# Patient Record
Sex: FEMALE
[De-identification: names, ages, dates, MRNs, and addresses within clinical notes are randomized per-mention and may not be internally consistent; named-entity substitution may affect disease eponyms.]

---

## 2020-01-01 ENCOUNTER — RX ONLY (OUTPATIENT)
Age: 40
Setting detail: RX ONLY
End: 2020-01-01

## 2021-12-01 ENCOUNTER — OFFICE VISIT (OUTPATIENT)
Age: 41
End: 2021-12-01

## 2022-03-30 ENCOUNTER — TELEPHONE ENCOUNTER (OUTPATIENT)
Dept: URBAN - METROPOLITAN AREA CLINIC 9 | Facility: CLINIC | Age: 42
End: 2022-03-30

## 2022-03-30 ENCOUNTER — OFFICE VISIT (OUTPATIENT)
Dept: URBAN - METROPOLITAN AREA CLINIC 9 | Facility: CLINIC | Age: 42
End: 2022-03-30

## 2022-04-05 ENCOUNTER — OFFICE VISIT (OUTPATIENT)
Dept: URBAN - METROPOLITAN AREA CLINIC 9 | Facility: CLINIC | Age: 42
End: 2022-04-05

## 2022-04-07 ENCOUNTER — TELEPHONE ENCOUNTER (OUTPATIENT)
Dept: URBAN - METROPOLITAN AREA CLINIC 9 | Facility: CLINIC | Age: 42
End: 2022-04-07

## 2022-04-13 ENCOUNTER — OFFICE VISIT (OUTPATIENT)
Dept: URBAN - METROPOLITAN AREA CLINIC 63 | Facility: CLINIC | Age: 42
End: 2022-04-13

## 2022-05-02 ENCOUNTER — TELEPHONE ENCOUNTER (OUTPATIENT)
Dept: URBAN - METROPOLITAN AREA CLINIC 9 | Facility: CLINIC | Age: 42
End: 2022-05-02

## 2022-05-04 ENCOUNTER — OFFICE VISIT (OUTPATIENT)
Dept: URBAN - METROPOLITAN AREA SURGERY CENTER 9 | Facility: SURGERY CENTER | Age: 42
End: 2022-05-04

## 2022-05-21 ENCOUNTER — LAB OUTSIDE AN ENCOUNTER (OUTPATIENT)
Age: 42
End: 2022-05-21

## 2022-05-24 LAB
ABSOLUTE BASOPHILS: (no result)
ABSOLUTE EOSINOPHILS: (no result)
ABSOLUTE LYMPHOCYTES: (no result)
ABSOLUTE MONOCYTES: (no result)
ABSOLUTE NEUTROPHILS: (no result)
ALBUMIN/GLOBULIN RATIO: (no result)
ALBUMIN: (no result)
ALKALINE PHOSPHATASE: (no result)
ALT: (no result)
AST: (no result)
BASOPHILS: (no result)
BILIRUBIN, DIRECT: (no result)
BILIRUBIN, INDIRECT: 0.3
BILIRUBIN, TOTAL: (no result)
C-REACTIVE PROTEIN: (no result)
EOSINOPHILS: (no result)
GLOBULIN: 2.7
HEMATOCRIT: (no result)
HEMOGLOBIN: (no result)
LIPASE: (no result)
LYMPHOCYTES: (no result)
MCH: (no result)
MCHC: (no result)
MCV: (no result)
MONOCYTES: (no result)
MPV: (no result)
NEUTROPHILS: (no result)
PLATELET COUNT: 355
PROTEIN, TOTAL: (no result)
RDW: (no result)
RED BLOOD CELL COUNT: (no result)
WHITE BLOOD CELL COUNT: 9.4

## 2022-06-06 ENCOUNTER — TELEPHONE ENCOUNTER (OUTPATIENT)
Dept: URBAN - METROPOLITAN AREA CLINIC 9 | Facility: CLINIC | Age: 42
End: 2022-06-06

## 2022-06-22 ENCOUNTER — TELEPHONE ENCOUNTER (OUTPATIENT)
Dept: URBAN - METROPOLITAN AREA CLINIC 9 | Facility: CLINIC | Age: 42
End: 2022-06-22

## 2022-07-01 ENCOUNTER — TELEPHONE ENCOUNTER (OUTPATIENT)
Dept: URBAN - METROPOLITAN AREA CLINIC 9 | Facility: CLINIC | Age: 42
End: 2022-07-01

## 2022-07-08 ENCOUNTER — OFFICE VISIT (OUTPATIENT)
Dept: URBAN - METROPOLITAN AREA CLINIC 9 | Facility: CLINIC | Age: 42
End: 2022-07-08

## 2022-07-09 ENCOUNTER — TELEPHONE ENCOUNTER (OUTPATIENT)
Dept: URBAN - METROPOLITAN AREA CLINIC 121 | Facility: CLINIC | Age: 42
End: 2022-07-09

## 2022-07-10 ENCOUNTER — TELEPHONE ENCOUNTER (OUTPATIENT)
Dept: URBAN - METROPOLITAN AREA CLINIC 121 | Facility: CLINIC | Age: 42
End: 2022-07-10

## 2022-07-11 ENCOUNTER — TELEPHONE ENCOUNTER (OUTPATIENT)
Dept: URBAN - METROPOLITAN AREA CLINIC 9 | Facility: CLINIC | Age: 42
End: 2022-07-11

## 2022-07-13 ENCOUNTER — TELEPHONE ENCOUNTER (OUTPATIENT)
Dept: URBAN - METROPOLITAN AREA CLINIC 9 | Facility: CLINIC | Age: 42
End: 2022-07-13

## 2022-07-18 ENCOUNTER — TELEPHONE ENCOUNTER (OUTPATIENT)
Dept: URBAN - METROPOLITAN AREA CLINIC 9 | Facility: CLINIC | Age: 42
End: 2022-07-18

## 2022-07-25 ENCOUNTER — TELEPHONE ENCOUNTER (OUTPATIENT)
Dept: URBAN - METROPOLITAN AREA CLINIC 9 | Facility: CLINIC | Age: 42
End: 2022-07-25

## 2022-07-30 ENCOUNTER — TELEPHONE ENCOUNTER (OUTPATIENT)
Age: 42
End: 2022-07-30

## 2022-07-30 RX ORDER — SIMETHICONE 125 MG/1
1 (ONE) TABLET, CHEWABLE ORAL
Qty: 0 | Refills: 2 | OUTPATIENT
Start: 2022-04-05 | End: 2022-05-05

## 2022-07-31 ENCOUNTER — TELEPHONE ENCOUNTER (OUTPATIENT)
Age: 42
End: 2022-07-31

## 2022-07-31 RX ORDER — SIMETHICONE 125 MG/1
1 (ONE) TABLET, CHEWABLE ORAL
Qty: 0 | Refills: 2 | Status: ACTIVE | COMMUNITY
Start: 2022-04-05

## 2022-08-12 ENCOUNTER — LAB OUTSIDE AN ENCOUNTER (OUTPATIENT)
Dept: URBAN - METROPOLITAN AREA CLINIC 9 | Facility: CLINIC | Age: 42
End: 2022-08-12

## 2022-08-12 ENCOUNTER — OFFICE VISIT (OUTPATIENT)
Dept: URBAN - METROPOLITAN AREA CLINIC 9 | Facility: CLINIC | Age: 42
End: 2022-08-12
Payer: COMMERCIAL

## 2022-08-12 VITALS
BODY MASS INDEX: 39.51 KG/M2 | SYSTOLIC BLOOD PRESSURE: 114 MMHG | HEIGHT: 63 IN | WEIGHT: 223 LBS | DIASTOLIC BLOOD PRESSURE: 70 MMHG

## 2022-08-12 DIAGNOSIS — E66.9 OBESITY WITH BODY MASS INDEX 30 OR GREATER: ICD-10-CM

## 2022-08-12 DIAGNOSIS — K76.0 FATTY LIVER: ICD-10-CM

## 2022-08-12 DIAGNOSIS — R10.13 ABDOMINAL PAIN, EPIGASTRIC: ICD-10-CM

## 2022-08-12 DIAGNOSIS — R14.0 BLOATING: ICD-10-CM

## 2022-08-12 DIAGNOSIS — K74.02 ADVANCED HEPATIC FIBROSIS: ICD-10-CM

## 2022-08-12 DIAGNOSIS — R79.89 ABNORMAL LFTS: ICD-10-CM

## 2022-08-12 PROCEDURE — 99215 OFFICE O/P EST HI 40 MIN: CPT | Performed by: INTERNAL MEDICINE

## 2022-08-12 PROCEDURE — 76942 ECHO GUIDE FOR BIOPSY: CPT | Performed by: INTERNAL MEDICINE

## 2022-08-12 RX ORDER — SIMETHICONE 125 MG/1
1 (ONE) TABLET, CHEWABLE ORAL
Qty: 0 | Refills: 2 | Status: DISCONTINUED | COMMUNITY
Start: 2022-04-05

## 2022-08-12 RX ORDER — BACITRACIN 500 UNIT/G
AS DIRECTED OINTMENT (GRAM) TOPICAL ONCE DAILY
Qty: 30 | Refills: 0 | OUTPATIENT
Start: 2022-08-12 | End: 2022-09-11

## 2022-08-12 RX ORDER — HYDROCHLOROTHIAZIDE 25 MG/1
TAKE 1 TABLET BY MOUTH EVERY DAY TABLET ORAL
Qty: 30 EACH | Refills: 2 | Status: ACTIVE | COMMUNITY

## 2022-08-12 RX ORDER — LISINOPRIL 10 MG/1
TAKE 1 TABLET BY MOUTH DAILY. STOP COMBO PILL. TAKE DAILY WITH HCTZ BY MOUTH EVERY DAY TABLET ORAL
Qty: 30 EACH | Refills: 2 | Status: ACTIVE | COMMUNITY

## 2022-08-12 NOTE — HPI-TODAY'S VISIT:
42yo F seen for mild elevated transaminases fibroscan showing S3F4. drinks 5 drinks on friday night for the past several years.  She has no known prior hx of liver dz.  She has no fmh of liver dz.  We discussed gettting further testing and doing liver bx to confirm degree of fibrosis given she has no known prior liver dz.  She also has autoimmune thyroid dz. There has been no associated dysphagia, GERD, anorexia, weight loss, anemia, occult blood in stool, rectal bleeding, melena, nausea, vomiting, abdominal pain, postprandial fullness, early satiety, abdominal distention, bloating, change in bowel habits, change in stool caliber, diarrhea, constipation, anticoagulant/antiplatelet or NSAID's use.     No FMH of GI cancers including CRC.

## 2022-08-15 ENCOUNTER — TELEPHONE ENCOUNTER (OUTPATIENT)
Dept: URBAN - METROPOLITAN AREA CLINIC 7 | Facility: CLINIC | Age: 42
End: 2022-08-15

## 2022-08-15 ENCOUNTER — LAB OUTSIDE AN ENCOUNTER (OUTPATIENT)
Dept: URBAN - METROPOLITAN AREA CLINIC 7 | Facility: CLINIC | Age: 42
End: 2022-08-15

## 2022-08-19 ENCOUNTER — LAB OUTSIDE AN ENCOUNTER (OUTPATIENT)
Dept: URBAN - METROPOLITAN AREA CLINIC 9 | Facility: CLINIC | Age: 42
End: 2022-08-19

## 2022-08-23 LAB
% SATURATION: 30
A/G RATIO: 1.7
ABSOLUTE BASOPHILS: 31
ABSOLUTE EOSINOPHILS: 148
ABSOLUTE LYMPHOCYTES: 2005
ABSOLUTE MONOCYTES: 406
ABSOLUTE NEUTROPHILS: 5210
ACTIN (SMOOTH MUSCLE) ANTIBODY (IGG): <20
AFP, SERUM, TUMOR MARKER: 3.1
ALBUMIN: 4.4
ALKALINE PHOSPHATASE: 79
ALT (SGPT): 35
ANA SCREEN, IFA: NEGATIVE
AST (SGOT): 43
BASOPHILS: 0.4
BILIRUBIN, TOTAL: 1.1
BUN/CREATININE RATIO: (no result)
BUN: 10
CALCIUM: 9.8
CARBON DIOXIDE, TOTAL: 27
CHLORIDE: 101
CREATININE: 0.65
EGFR: 121
EOSINOPHILS: 1.9
FERRITIN: 135
GGT: 53
GLOBULIN, TOTAL: 2.6
GLUCOSE: 89
HBSAG SCREEN: (no result)
HEMATOCRIT: 42.9
HEMOGLOBIN: 13.9
HEP A AB, IGM: (no result)
HEP B CORE AB, IGM: (no result)
HEP C VIRUS AB: 0
HEPATITIS C ANTIBODY: (no result)
INR: 1.1
IRON BINDING CAPACITY: 326
IRON, TOTAL: 99
LD: 122
LYMPHOCYTES: 25.7
MCH: 27.3
MCHC: 32.4
MCV: 84.3
MITOCHONDRIAL (M2) ANTIBODY: <=20
MONOCYTES: 5.2
MPV: 9.3
NEUTROPHILS: 66.8
PLATELET COUNT: 380
POTASSIUM: 3.9
PROTEIN, TOTAL: 7
PT: 10.5
RDW: 14
RED BLOOD CELL COUNT: 5.09
RHEUMATOID FACTOR: <14
SJOGREN'S ANTIBODY (SS-A): (no result)
SJOGREN'S ANTIBODY (SS-B): (no result)
SODIUM: 137
WHITE BLOOD CELL COUNT: 7.8

## 2022-09-09 ENCOUNTER — OFFICE VISIT (OUTPATIENT)
Dept: URBAN - METROPOLITAN AREA SURGERY CENTER 9 | Facility: SURGERY CENTER | Age: 42
End: 2022-09-09

## 2022-10-07 ENCOUNTER — OFFICE VISIT (OUTPATIENT)
Dept: URBAN - METROPOLITAN AREA CLINIC 9 | Facility: CLINIC | Age: 42
End: 2022-10-07

## 2022-10-19 ENCOUNTER — APPOINTMENT (RX ONLY)
Dept: URBAN - METROPOLITAN AREA CLINIC 117 | Facility: CLINIC | Age: 42
Setting detail: DERMATOLOGY
End: 2022-10-19

## 2022-10-19 DIAGNOSIS — L21.8 OTHER SEBORRHEIC DERMATITIS: ICD-10-CM

## 2022-10-19 PROCEDURE — ? PRESCRIPTION

## 2022-10-19 PROCEDURE — ? COUNSELING

## 2022-10-19 PROCEDURE — 99204 OFFICE O/P NEW MOD 45 MIN: CPT

## 2022-10-19 RX ORDER — FLUTICASONE PROPIONATE 0.5 MG/G
CREAM TOPICAL BID
Qty: 60 | Refills: 1 | Status: ERX | COMMUNITY
Start: 2022-10-19

## 2022-10-19 RX ADMIN — FLUTICASONE PROPIONATE: 0.5 CREAM TOPICAL at 00:00

## 2022-10-19 ASSESSMENT — LOCATION ZONE DERM: LOCATION ZONE: FACE

## 2022-10-19 ASSESSMENT — LOCATION SIMPLE DESCRIPTION DERM: LOCATION SIMPLE: LEFT CHEEK

## 2022-10-19 ASSESSMENT — LOCATION DETAILED DESCRIPTION DERM: LOCATION DETAILED: LEFT MEDIAL MALAR CHEEK

## 2022-10-28 ENCOUNTER — OFFICE VISIT (OUTPATIENT)
Dept: URBAN - METROPOLITAN AREA CLINIC 9 | Facility: CLINIC | Age: 42
End: 2022-10-28
Payer: COMMERCIAL

## 2022-10-28 ENCOUNTER — LAB OUTSIDE AN ENCOUNTER (OUTPATIENT)
Dept: URBAN - METROPOLITAN AREA CLINIC 9 | Facility: CLINIC | Age: 42
End: 2022-10-28

## 2022-10-28 ENCOUNTER — WEB ENCOUNTER (OUTPATIENT)
Dept: URBAN - METROPOLITAN AREA CLINIC 9 | Facility: CLINIC | Age: 42
End: 2022-10-28

## 2022-10-28 VITALS
DIASTOLIC BLOOD PRESSURE: 72 MMHG | HEIGHT: 63 IN | WEIGHT: 210 LBS | BODY MASS INDEX: 37.21 KG/M2 | SYSTOLIC BLOOD PRESSURE: 110 MMHG

## 2022-10-28 DIAGNOSIS — K76.0 FATTY LIVER: ICD-10-CM

## 2022-10-28 DIAGNOSIS — R79.89 ABNORMAL LFTS: ICD-10-CM

## 2022-10-28 DIAGNOSIS — K74.02 ADVANCED HEPATIC FIBROSIS: ICD-10-CM

## 2022-10-28 PROCEDURE — 99215 OFFICE O/P EST HI 40 MIN: CPT | Performed by: INTERNAL MEDICINE

## 2022-10-28 RX ORDER — LISINOPRIL 10 MG/1
TAKE 1 TABLET BY MOUTH DAILY. STOP COMBO PILL. TAKE DAILY WITH HCTZ BY MOUTH EVERY DAY TABLET ORAL
Qty: 30 EACH | Refills: 2 | Status: ACTIVE | COMMUNITY

## 2022-10-28 RX ORDER — HYDROCHLOROTHIAZIDE 25 MG/1
TAKE 1 TABLET BY MOUTH EVERY DAY TABLET ORAL
Qty: 30 EACH | Refills: 2 | Status: ACTIVE | COMMUNITY

## 2022-10-28 RX ORDER — BACITRACIN 500 UNIT/G
AS DIRECTED OINTMENT (GRAM) TOPICAL ONCE DAILY
Qty: 30 | Refills: 0 | OUTPATIENT
Start: 2022-10-28 | End: 2022-11-27

## 2022-10-28 RX ORDER — LEVOTHYROXINE SODIUM 50 UG/1
1 TABLET IN THE MORNING ON AN EMPTY STOMACH TABLET ORAL ONCE A DAY
Status: ACTIVE | COMMUNITY

## 2022-10-28 NOTE — HPI-TODAY'S VISIT:
40yo F seen for mild elevated transaminases fibroscan showing S3F4. drinks 5 drinks on friday night for the past several years.  She has no known prior hx of liver dz.  She has no fmh of liver dz.  We discussed gettting further testing and doing liver bx to confirm degree of fibrosis given she has no known prior liver dz.  She also has autoimmune thyroid dz. There has been no associated dysphagia, GERD, anorexia, weight loss, anemia, occult blood in stool, rectal bleeding, melena, nausea, vomiting, abdominal pain, postprandial fullness, early satiety, abdominal distention, bloating, change in bowel habits, change in stool caliber, diarrhea, constipation, anticoagulant/antiplatelet or NSAID's use.     No FMH of GI cancers including CRC.    Interval hx 10/28/22 liver bx 9/2022 showing S4 F2-3, labs negative for autoimmune hep, nonimmune hep a and b.  Patient is not drinking etoh like she previolsy was now jsut one glass of proseco every couple weeks.  she had lost over 20lb in the past few months.  LFTs normalized.  EGD is still pending.  We discussed continued diet and exercise and etoh abstinence.

## 2022-10-31 PROBLEM — 62484002: Status: ACTIVE | Noted: 2022-08-12

## 2022-11-11 ENCOUNTER — OFFICE VISIT (OUTPATIENT)
Dept: URBAN - METROPOLITAN AREA CLINIC 9 | Facility: CLINIC | Age: 42
End: 2022-11-11

## 2022-11-14 ENCOUNTER — OFFICE VISIT (OUTPATIENT)
Dept: URBAN - METROPOLITAN AREA SURGERY CENTER 5 | Facility: SURGERY CENTER | Age: 42
End: 2022-11-14
Payer: COMMERCIAL

## 2022-11-14 ENCOUNTER — CLAIMS CREATED FROM THE CLAIM WINDOW (OUTPATIENT)
Dept: URBAN - METROPOLITAN AREA CLINIC 8 | Facility: CLINIC | Age: 42
End: 2022-11-14
Payer: COMMERCIAL

## 2022-11-14 DIAGNOSIS — K29.50 ANTRAL GASTRITIS: ICD-10-CM

## 2022-11-14 DIAGNOSIS — K76.6 CLINICALLY SIGNIFICANT PORTAL HYPERTENSION: ICD-10-CM

## 2022-11-14 DIAGNOSIS — K29.70 CHRONIC ACITVE GASTRITIS (H.PYLORI NEGATIVE): ICD-10-CM

## 2022-11-14 PROCEDURE — 88312 SPECIAL STAINS GROUP 1: CPT | Performed by: INTERNAL MEDICINE

## 2022-11-14 PROCEDURE — 43239 EGD BIOPSY SINGLE/MULTIPLE: CPT | Performed by: INTERNAL MEDICINE

## 2022-11-14 RX ORDER — BACITRACIN 500 UNIT/G
AS DIRECTED OINTMENT (GRAM) TOPICAL ONCE DAILY
Qty: 30 | Refills: 0 | Status: ACTIVE | COMMUNITY
Start: 2022-10-28 | End: 2022-11-27

## 2022-11-14 RX ORDER — LEVOTHYROXINE SODIUM 50 UG/1
1 TABLET IN THE MORNING ON AN EMPTY STOMACH TABLET ORAL ONCE A DAY
Status: ACTIVE | COMMUNITY

## 2022-11-14 RX ORDER — LISINOPRIL 10 MG/1
TAKE 1 TABLET BY MOUTH DAILY. STOP COMBO PILL. TAKE DAILY WITH HCTZ BY MOUTH EVERY DAY TABLET ORAL
Qty: 30 EACH | Refills: 2 | Status: ACTIVE | COMMUNITY

## 2022-11-14 RX ORDER — HYDROCHLOROTHIAZIDE 25 MG/1
TAKE 1 TABLET BY MOUTH EVERY DAY TABLET ORAL
Qty: 30 EACH | Refills: 2 | Status: ACTIVE | COMMUNITY

## 2022-11-22 ENCOUNTER — TELEPHONE ENCOUNTER (OUTPATIENT)
Dept: URBAN - METROPOLITAN AREA CLINIC 7 | Facility: CLINIC | Age: 42
End: 2022-11-22

## 2022-12-09 ENCOUNTER — OFFICE VISIT (OUTPATIENT)
Dept: URBAN - METROPOLITAN AREA CLINIC 9 | Facility: CLINIC | Age: 42
End: 2022-12-09

## 2023-01-27 ENCOUNTER — TELEPHONE ENCOUNTER (OUTPATIENT)
Dept: URBAN - METROPOLITAN AREA CLINIC 7 | Facility: CLINIC | Age: 43
End: 2023-01-27

## 2023-04-06 ENCOUNTER — TELEPHONE ENCOUNTER (OUTPATIENT)
Dept: URBAN - METROPOLITAN AREA CLINIC 7 | Facility: CLINIC | Age: 43
End: 2023-04-06

## 2023-05-12 ENCOUNTER — LAB OUTSIDE AN ENCOUNTER (OUTPATIENT)
Dept: URBAN - METROPOLITAN AREA CLINIC 7 | Facility: CLINIC | Age: 43
End: 2023-05-12

## 2023-05-13 LAB
A/G RATIO: 1.5
ALBUMIN: 4.4
ALKALINE PHOSPHATASE: 70
ALT (SGPT): 10
AST (SGOT): 14
BILIRUBIN, TOTAL: 1
BUN/CREATININE RATIO: (no result)
BUN: 8
CALCIUM: 10.3
CARBON DIOXIDE, TOTAL: 30
CHLORIDE: 99
CREATININE: 0.71
EGFR: 108
GLOBULIN, TOTAL: 3
GLUCOSE: 85
POTASSIUM: 4.7
PROTEIN, TOTAL: 7.4
SODIUM: 137

## 2023-05-19 ENCOUNTER — LAB OUTSIDE AN ENCOUNTER (OUTPATIENT)
Dept: URBAN - METROPOLITAN AREA CLINIC 9 | Facility: CLINIC | Age: 43
End: 2023-05-19

## 2023-05-19 ENCOUNTER — OFFICE VISIT (OUTPATIENT)
Dept: URBAN - METROPOLITAN AREA CLINIC 9 | Facility: CLINIC | Age: 43
End: 2023-05-19
Payer: COMMERCIAL

## 2023-05-19 VITALS
HEIGHT: 63 IN | BODY MASS INDEX: 31.01 KG/M2 | WEIGHT: 175 LBS | SYSTOLIC BLOOD PRESSURE: 122 MMHG | DIASTOLIC BLOOD PRESSURE: 78 MMHG

## 2023-05-19 DIAGNOSIS — K76.0 FATTY LIVER: ICD-10-CM

## 2023-05-19 DIAGNOSIS — R79.89 ABNORMAL LFTS: ICD-10-CM

## 2023-05-19 PROCEDURE — 99214 OFFICE O/P EST MOD 30 MIN: CPT | Performed by: INTERNAL MEDICINE

## 2023-05-19 RX ORDER — LISINOPRIL 10 MG/1
TAKE 1 TABLET BY MOUTH DAILY. STOP COMBO PILL. TAKE DAILY WITH HCTZ BY MOUTH EVERY DAY TABLET ORAL
Qty: 30 EACH | Refills: 2 | Status: ACTIVE | COMMUNITY

## 2023-05-19 RX ORDER — HYDROCHLOROTHIAZIDE 25 MG/1
TAKE 1 TABLET BY MOUTH EVERY DAY TABLET ORAL
Qty: 30 EACH | Refills: 2 | Status: ACTIVE | COMMUNITY

## 2023-05-19 RX ORDER — LEVOTHYROXINE SODIUM 50 UG/1
1 TABLET IN THE MORNING ON AN EMPTY STOMACH TABLET ORAL ONCE A DAY
Status: ACTIVE | COMMUNITY

## 2023-05-19 NOTE — HPI-TODAY'S VISIT:
40yo F seen for mild elevated transaminases fibroscan showing S3F4. drinks 5 drinks on friday night for the past several years.  She has no known prior hx of liver dz.  She has no fmh of liver dz.  We discussed gettting further testing and doing liver bx to confirm degree of fibrosis given she has no known prior liver dz.  She also has autoimmune thyroid dz. There has been no associated dysphagia, GERD, anorexia, weight loss, anemia, occult blood in stool, rectal bleeding, melena, nausea, vomiting, abdominal pain, postprandial fullness, early satiety, abdominal distention, bloating, change in bowel habits, change in stool caliber, diarrhea, constipation, anticoagulant/antiplatelet or NSAID's use.     No FMH of GI cancers including CRC.    Interval hx 10/28/22 liver bx 9/2022 showing S4 F2-3, labs negative for autoimmune hep, nonimmune hep a and b.  Patient is not drinking etoh like she previolsy was now jsut one glass of proseco every couple weeks.  she had lost over 20lb in the past few months.  LFTs normalized.  EGD is still pending.  We discussed continued diet and exercise and etoh abstinence.   Interval hx 5/19/23 s/p EGD no varices or evidience of portal htn seen.  labs from 5/2023 show completely normalized LFTs.  pt has lost 35lb from dieting.  She is doing great feels very healthy.  no etoh use.  she is also seeing cardologist working on metabolic syndrome.  she is taking vitamin E.  We talked about a work out plan to help as well.  We will see her back in 6 months to repeat fibroscan and labs and US liver

## 2023-08-02 ENCOUNTER — TELEPHONE ENCOUNTER (OUTPATIENT)
Dept: URBAN - METROPOLITAN AREA CLINIC 7 | Facility: CLINIC | Age: 43
End: 2023-08-02

## 2023-08-03 ENCOUNTER — TELEPHONE ENCOUNTER (OUTPATIENT)
Dept: URBAN - METROPOLITAN AREA CLINIC 7 | Facility: CLINIC | Age: 43
End: 2023-08-03

## 2023-08-03 ENCOUNTER — TELEPHONE ENCOUNTER (OUTPATIENT)
Dept: URBAN - METROPOLITAN AREA CLINIC 63 | Facility: CLINIC | Age: 43
End: 2023-08-03

## 2023-08-04 ENCOUNTER — OFFICE VISIT (OUTPATIENT)
Dept: URBAN - METROPOLITAN AREA CLINIC 9 | Facility: CLINIC | Age: 43
End: 2023-08-04
Payer: COMMERCIAL

## 2023-08-04 ENCOUNTER — LAB OUTSIDE AN ENCOUNTER (OUTPATIENT)
Dept: URBAN - METROPOLITAN AREA CLINIC 9 | Facility: CLINIC | Age: 43
End: 2023-08-04

## 2023-08-04 VITALS
SYSTOLIC BLOOD PRESSURE: 119 MMHG | HEIGHT: 63 IN | BODY MASS INDEX: 30.48 KG/M2 | DIASTOLIC BLOOD PRESSURE: 73 MMHG | WEIGHT: 172 LBS

## 2023-08-04 DIAGNOSIS — R14.0 BLOATING: ICD-10-CM

## 2023-08-04 DIAGNOSIS — K74.02 ADVANCED HEPATIC FIBROSIS: ICD-10-CM

## 2023-08-04 DIAGNOSIS — R10.13 ABDOMINAL PAIN, EPIGASTRIC: ICD-10-CM

## 2023-08-04 DIAGNOSIS — R79.89 ABNORMAL LFTS: ICD-10-CM

## 2023-08-04 DIAGNOSIS — R10.31 RLQ ABDOMINAL PAIN: ICD-10-CM

## 2023-08-04 DIAGNOSIS — E66.9 OBESITY WITH BODY MASS INDEX 30 OR GREATER: ICD-10-CM

## 2023-08-04 DIAGNOSIS — K76.0 FATTY LIVER: ICD-10-CM

## 2023-08-04 PROBLEM — 301754002: Status: ACTIVE | Noted: 2023-08-04

## 2023-08-04 PROCEDURE — 99215 OFFICE O/P EST HI 40 MIN: CPT | Performed by: INTERNAL MEDICINE

## 2023-08-04 RX ORDER — HYDROCHLOROTHIAZIDE 25 MG/1
TAKE 1 TABLET BY MOUTH EVERY DAY TABLET ORAL
Qty: 30 EACH | Refills: 2 | Status: ACTIVE | COMMUNITY

## 2023-08-04 RX ORDER — LEVOTHYROXINE SODIUM 50 UG/1
1 TABLET IN THE MORNING ON AN EMPTY STOMACH TABLET ORAL ONCE A DAY
Status: ACTIVE | COMMUNITY

## 2023-08-04 RX ORDER — LISINOPRIL 10 MG/1
TAKE 1 TABLET BY MOUTH DAILY. STOP COMBO PILL. TAKE DAILY WITH HCTZ BY MOUTH EVERY DAY TABLET ORAL
Qty: 30 EACH | Refills: 2 | Status: ACTIVE | COMMUNITY

## 2023-08-04 NOTE — HPI-TODAY'S VISIT:
42yo F seen for mild elevated transaminases fibroscan showing S3F4. drinks 5 drinks on friday night for the past several years.  She has no known prior hx of liver dz.  She has no fmh of liver dz.  We discussed gettting further testing and doing liver bx to confirm degree of fibrosis given she has no known prior liver dz.  She also has autoimmune thyroid dz. There has been no associated dysphagia, GERD, anorexia, weight loss, anemia, occult blood in stool, rectal bleeding, melena, nausea, vomiting, abdominal pain, postprandial fullness, early satiety, abdominal distention, bloating, change in bowel habits, change in stool caliber, diarrhea, constipation, anticoagulant/antiplatelet or NSAID's use.     No FMH of GI cancers including CRC.    Interval hx 10/28/22 liver bx 9/2022 showing S4 F2-3, labs negative for autoimmune hep, nonimmune hep a and b.  Patient is not drinking etoh like she previolsy was now jsut one glass of proseco every couple weeks.  she had lost over 20lb in the past few months.  LFTs normalized.  EGD is still pending.  We discussed continued diet and exercise and etoh abstinence.   Interval hx 5/19/23 s/p EGD no varices or evidience of portal htn seen.  labs from 5/2023 show completely normalized LFTs.  pt has lost 35lb from dieting.  She is doing great feels very healthy.  no etoh use.  she is also seeing cardologist working on metabolic syndrome.  she is taking vitamin E.  We talked about a work out plan to help as well.  We will see her back in 6 months to repeat fibroscan and labs and US liver   Interval hx 8.4/23 now s/p multple ER visits for lower abd pain, reviewed cT and hosptial records, discussed case with Dr. Ramirez as well.  Ct shows an evolution of complex cyst from 3.5--4.5cm in 2 days with fluid in pelvis c/w PID vs tubo ovarian abscess.  This does not seem gastrointestinal in presentation.  given location and the fact that patient had diarrhea at that time will do colonoscopy which she has not had to r/o coliits or colon path however believe this to be related to r adnexa findings.

## 2023-08-25 ENCOUNTER — OFFICE VISIT (OUTPATIENT)
Dept: URBAN - METROPOLITAN AREA CLINIC 9 | Facility: CLINIC | Age: 43
End: 2023-08-25

## 2023-09-01 ENCOUNTER — CLAIMS CREATED FROM THE CLAIM WINDOW (OUTPATIENT)
Dept: URBAN - METROPOLITAN AREA SURGERY CENTER 9 | Facility: SURGERY CENTER | Age: 43
End: 2023-09-01
Payer: COMMERCIAL

## 2023-09-01 DIAGNOSIS — K64.1 SECOND DEGREE HEMORRHOIDS: ICD-10-CM

## 2023-09-01 DIAGNOSIS — R10.31 RIGHT LOWER QUADRANT PAIN: ICD-10-CM

## 2023-09-01 DIAGNOSIS — R10.31 ABDOMINAL PAIN, RIGHT LOWER QUADRANT: ICD-10-CM

## 2023-09-01 PROCEDURE — 00811 ANES LWR INTST NDSC NOS: CPT | Performed by: NURSE ANESTHETIST, CERTIFIED REGISTERED

## 2023-09-01 PROCEDURE — 45378 DIAGNOSTIC COLONOSCOPY: CPT | Performed by: INTERNAL MEDICINE

## 2023-09-01 RX ORDER — LISINOPRIL 10 MG/1
TAKE 1 TABLET BY MOUTH DAILY. STOP COMBO PILL. TAKE DAILY WITH HCTZ BY MOUTH EVERY DAY TABLET ORAL
Qty: 30 EACH | Refills: 2 | Status: ACTIVE | COMMUNITY

## 2023-09-01 RX ORDER — LEVOTHYROXINE SODIUM 50 UG/1
1 TABLET IN THE MORNING ON AN EMPTY STOMACH TABLET ORAL ONCE A DAY
Status: ACTIVE | COMMUNITY

## 2023-09-01 RX ORDER — HYDROCHLOROTHIAZIDE 25 MG/1
TAKE 1 TABLET BY MOUTH EVERY DAY TABLET ORAL
Qty: 30 EACH | Refills: 2 | Status: ACTIVE | COMMUNITY

## 2023-11-17 ENCOUNTER — OFFICE VISIT (OUTPATIENT)
Dept: URBAN - METROPOLITAN AREA CLINIC 9 | Facility: CLINIC | Age: 43
End: 2023-11-17

## 2023-12-10 LAB
A/G RATIO: 1.6
ALBUMIN: 4.2
ALKALINE PHOSPHATASE: 105
ALT (SGPT): 10
AST (SGOT): 14
BILIRUBIN, TOTAL: 0.5
BUN/CREATININE RATIO: (no result)
BUN: 9
CALCIUM: 9.1
CARBON DIOXIDE, TOTAL: 28
CHLORIDE: 100
CREATININE: 0.59
EGFR: 114
GLOBULIN, TOTAL: 2.6
GLUCOSE: 75
HEMATOCRIT: 39.4
HEMOGLOBIN: 13
MCH: 28.3
MCHC: 33
MCV: 85.8
MPV: 9.7
PLATELET COUNT: 304
POTASSIUM: 4.5
PROTEIN, TOTAL: 6.8
RDW: 12.7
RED BLOOD CELL COUNT: 4.59
SODIUM: 137
WHITE BLOOD CELL COUNT: 5.7

## 2023-12-15 ENCOUNTER — OFFICE VISIT (OUTPATIENT)
Dept: URBAN - METROPOLITAN AREA CLINIC 9 | Facility: CLINIC | Age: 43
End: 2023-12-15
Payer: COMMERCIAL

## 2023-12-15 ENCOUNTER — LAB OUTSIDE AN ENCOUNTER (OUTPATIENT)
Dept: URBAN - METROPOLITAN AREA CLINIC 9 | Facility: CLINIC | Age: 43
End: 2023-12-15

## 2023-12-15 ENCOUNTER — DASHBOARD ENCOUNTERS (OUTPATIENT)
Age: 43
End: 2023-12-15

## 2023-12-15 VITALS
HEIGHT: 63 IN | BODY MASS INDEX: 30.48 KG/M2 | SYSTOLIC BLOOD PRESSURE: 137 MMHG | WEIGHT: 172 LBS | DIASTOLIC BLOOD PRESSURE: 92 MMHG

## 2023-12-15 DIAGNOSIS — K74.02 ADVANCED HEPATIC FIBROSIS: ICD-10-CM

## 2023-12-15 DIAGNOSIS — R79.89 ABNORMAL LFTS: ICD-10-CM

## 2023-12-15 DIAGNOSIS — K76.0 FATTY LIVER: ICD-10-CM

## 2023-12-15 DIAGNOSIS — R10.31 RLQ ABDOMINAL PAIN: ICD-10-CM

## 2023-12-15 DIAGNOSIS — R14.0 BLOATING: ICD-10-CM

## 2023-12-15 DIAGNOSIS — R10.13 ABDOMINAL PAIN, EPIGASTRIC: ICD-10-CM

## 2023-12-15 DIAGNOSIS — E66.9 OBESITY WITH BODY MASS INDEX 30 OR GREATER: ICD-10-CM

## 2023-12-15 PROCEDURE — 99214 OFFICE O/P EST MOD 30 MIN: CPT | Performed by: INTERNAL MEDICINE

## 2023-12-15 RX ORDER — LISINOPRIL 10 MG/1
TAKE 1 TABLET BY MOUTH DAILY. STOP COMBO PILL. TAKE DAILY WITH HCTZ BY MOUTH EVERY DAY TABLET ORAL
Qty: 30 EACH | Refills: 2 | Status: ACTIVE | COMMUNITY

## 2023-12-15 RX ORDER — HYDROCHLOROTHIAZIDE 25 MG/1
TAKE 1 TABLET BY MOUTH EVERY DAY TABLET ORAL
Qty: 30 EACH | Refills: 2 | Status: ACTIVE | COMMUNITY

## 2023-12-15 RX ORDER — LEVOTHYROXINE SODIUM 50 UG/1
1 TABLET IN THE MORNING ON AN EMPTY STOMACH TABLET ORAL ONCE A DAY
Status: ACTIVE | COMMUNITY

## 2023-12-15 NOTE — HPI-TODAY'S VISIT:
40yo F seen for mild elevated transaminases fibroscan showing S3F4. drinks 5 drinks on friday night for the past several years.  She has no known prior hx of liver dz.  She has no fmh of liver dz.  We discussed gettting further testing and doing liver bx to confirm degree of fibrosis given she has no known prior liver dz.  She also has autoimmune thyroid dz. There has been no associated dysphagia, GERD, anorexia, weight loss, anemia, occult blood in stool, rectal bleeding, melena, nausea, vomiting, abdominal pain, postprandial fullness, early satiety, abdominal distention, bloating, change in bowel habits, change in stool caliber, diarrhea, constipation, anticoagulant/antiplatelet or NSAID's use.     No FMH of GI cancers including CRC.    Interval hx 10/28/22 liver bx 9/2022 showing S4 F2-3, labs negative for autoimmune hep, nonimmune hep a and b.  Patient is not drinking etoh like she previolsy was now jsut one glass of proseco every couple weeks.  she had lost over 20lb in the past few months.  LFTs normalized.  EGD is still pending.  We discussed continued diet and exercise and etoh abstinence.   Interval hx 5/19/23 s/p EGD no varices or evidience of portal htn seen.  labs from 5/2023 show completely normalized LFTs.  pt has lost 35lb from dieting.  She is doing great feels very healthy.  no etoh use.  she is also seeing cardologist working on metabolic syndrome.  she is taking vitamin E.  We talked about a work out plan to help as well.  We will see her back in 6 months to repeat fibroscan and labs and US liver   Interval hx 8.4/23 now s/p multple ER visits for lower abd pain, reviewed cT and hosptial records, discussed case with Dr. Ramirez as well.  Ct shows an evolution of complex cyst from 3.5--4.5cm in 2 days with fluid in pelvis c/w PID vs tubo ovarian abscess.  This does not seem gastrointestinal in presentation.  given location and the fact that patient had diarrhea at that time will do colonoscopy which she has not had to r/o coliits or colon path however believe this to be related to r adnexa findings. Interval history December 15, 2023 seen today for follow-up of liver function and fatty liver.  She has been asymptomatic no further abdominal complaints.  Status post blood work last week showing normal transaminases normal platelets liver function kidney function and CBC are entirely normal.  Repeat FibroScan showing S3 F4.  Unchanged from prior.  Her weight today is 172 pounds which is what it was in August 2023.  Discussed in detail the management of SARKAR recommending that she use her exercise and weekly weight loss.  Monitor screening for HCC and liver function every 6 months

## 2023-12-15 NOTE — PHYSICAL EXAM CONSTITUTIONAL:
well developed, well nourished , in no acute distress , ambulating without difficulty , normal communication ability
single lumen

## 2023-12-25 ENCOUNTER — WEB ENCOUNTER (OUTPATIENT)
Dept: URBAN - METROPOLITAN AREA CLINIC 9 | Facility: CLINIC | Age: 43
End: 2023-12-25

## 2024-06-07 ENCOUNTER — OFFICE VISIT (OUTPATIENT)
Dept: URBAN - METROPOLITAN AREA CLINIC 9 | Facility: CLINIC | Age: 44
End: 2024-06-07

## 2024-06-14 ENCOUNTER — OFFICE VISIT (OUTPATIENT)
Dept: URBAN - METROPOLITAN AREA CLINIC 9 | Facility: CLINIC | Age: 44
End: 2024-06-14